# Patient Record
Sex: FEMALE | Race: WHITE
[De-identification: names, ages, dates, MRNs, and addresses within clinical notes are randomized per-mention and may not be internally consistent; named-entity substitution may affect disease eponyms.]

---

## 2018-06-12 ENCOUNTER — HOSPITAL ENCOUNTER (INPATIENT)
Dept: HOSPITAL 1 - ED | Age: 48
LOS: 2 days | Discharge: HOME | DRG: 194 | End: 2018-06-14
Attending: FAMILY MEDICINE | Admitting: FAMILY MEDICINE
Payer: COMMERCIAL

## 2018-06-12 VITALS
HEIGHT: 60.98 IN | HEIGHT: 60.98 IN | WEIGHT: 201.17 LBS | WEIGHT: 201.17 LBS | BODY MASS INDEX: 37.98 KG/M2 | BODY MASS INDEX: 37.98 KG/M2

## 2018-06-12 VITALS — DIASTOLIC BLOOD PRESSURE: 60 MMHG | SYSTOLIC BLOOD PRESSURE: 118 MMHG

## 2018-06-12 DIAGNOSIS — E83.42: ICD-10-CM

## 2018-06-12 DIAGNOSIS — J20.9: ICD-10-CM

## 2018-06-12 DIAGNOSIS — E87.6: ICD-10-CM

## 2018-06-12 DIAGNOSIS — I50.43: ICD-10-CM

## 2018-06-12 DIAGNOSIS — E66.9: ICD-10-CM

## 2018-06-12 DIAGNOSIS — D50.9: ICD-10-CM

## 2018-06-12 DIAGNOSIS — Z83.3: ICD-10-CM

## 2018-06-12 DIAGNOSIS — E11.65: ICD-10-CM

## 2018-06-12 DIAGNOSIS — Z90.49: ICD-10-CM

## 2018-06-12 DIAGNOSIS — I11.0: Primary | ICD-10-CM

## 2018-06-12 DIAGNOSIS — Z79.899: ICD-10-CM

## 2018-06-12 LAB
ALBUMIN SERPL-MCNC: 3.4 G/DL (ref 3.4–5)
ALP SERPL-CCNC: 121 U/L (ref 46–116)
ALT SERPL-CCNC: 26 U/L (ref 14–59)
AMPHETAMINES UR QL SCN: (no result)
AST SERPL-CCNC: 17 U/L (ref 15–37)
BASOPHILS NFR BLD: 0 % (ref 0–2)
BILIRUB SERPL-MCNC: 0.9 MG/DL (ref 0.2–1)
BUN SERPL-MCNC: 16 MG/DL (ref 7–18)
CALCIUM SERPL-MCNC: 8.7 MG/DL (ref 8.5–10.1)
CHLORIDE SERPL-SCNC: 105 MMOL/L (ref 98–107)
CHOLEST SERPL-MCNC: 118 MG/DL (ref ?–200)
CHOLEST/HDLC SERPL: 2.6 MG/DL
CO2 SERPL-SCNC: 23.5 MMOL/L (ref 21–32)
CREAT SERPL-MCNC: 0.9 MG/DL (ref 0.6–1)
ERYTHROCYTE [DISTWIDTH] IN BLOOD BY AUTOMATED COUNT: 18.6 % (ref 11.5–14.5)
GFR SERPLBLD BASED ON 1.73 SQ M-ARVRAT: > 60 ML/MIN
GLUCOSE SERPL-MCNC: 179 MG/DL (ref 74–106)
HDLC SERPL-MCNC: 46 MG/DL (ref 40–60)
LIPASE SERPL-CCNC: 99 IU/L (ref 73–393)
MAGNESIUM SERPL-MCNC: 1.6 MG/DL (ref 1.8–2.4)
MICROSCOPIC UR-IMP: NO
MONOCYTES NFR BLD: 7 % (ref 0–7)
NEUTS BAND NFR BLD: 0 % (ref 0–10)
NEUTS SEG NFR BLD MANUAL: 75 % (ref 37–75)
OSMOLALITY SERPL: 291 MOSM/KG (ref 278–298)
PHOSPHATE SERPL-MCNC: 2.8 MG/DL (ref 2.5–4.9)
PLAT MORPH BLD: (no result)
PLATELET # BLD: 177 X10^3MCL (ref 130–400)
POTASSIUM SERPL-SCNC: 3.7 MMOL/L (ref 3.5–5.1)
PROT SERPL-MCNC: 7.5 G/DL (ref 6.4–8.2)
RBC # UR STRIP.AUTO: NEGATIVE /UL
RBC MORPH BLD: (no result)
SODIUM SERPL-SCNC: 140 MMOL/L (ref 136–145)
T3 SERPL-MCNC: 0.52 NG/ML
T3RU NFR SERPL: 31 % UPTAKE (ref 30–39)
T4 FREE SERPL-MCNC: 1.36 NG/DL (ref 0.76–1.46)
T4 FREE SERPL-MCNC: 1.38 NG/DL (ref 0.76–1.46)
T4 SERPL-MCNC: 7.6 UG/DL (ref 4.7–13.3)
T4/T3 UPTAKE INDEX SERPL: 2.4 UG/DL (ref 1.4–4.5)
TRIGL SERPL-MCNC: 53 MG/DL (ref ?–150)
UA SPECIFIC GRAVITY: <=1.005 (ref 1–1.03)

## 2018-06-13 VITALS — DIASTOLIC BLOOD PRESSURE: 63 MMHG | SYSTOLIC BLOOD PRESSURE: 111 MMHG

## 2018-06-13 VITALS — DIASTOLIC BLOOD PRESSURE: 52 MMHG | SYSTOLIC BLOOD PRESSURE: 101 MMHG

## 2018-06-13 VITALS — DIASTOLIC BLOOD PRESSURE: 69 MMHG | SYSTOLIC BLOOD PRESSURE: 129 MMHG

## 2018-06-13 VITALS — DIASTOLIC BLOOD PRESSURE: 52 MMHG | SYSTOLIC BLOOD PRESSURE: 99 MMHG

## 2018-06-13 VITALS — DIASTOLIC BLOOD PRESSURE: 56 MMHG | SYSTOLIC BLOOD PRESSURE: 115 MMHG

## 2018-06-13 LAB
BASOPHILS NFR BLD: 0.2 % (ref 0–2)
BUN SERPL-MCNC: 15 MG/DL (ref 7–18)
CALCIUM SERPL-MCNC: 8.8 MG/DL (ref 8.5–10.1)
CHLORIDE SERPL-SCNC: 107 MMOL/L (ref 98–107)
CO2 SERPL-SCNC: 25.5 MMOL/L (ref 21–32)
CREAT SERPL-MCNC: 0.8 MG/DL (ref 0.6–1)
ERYTHROCYTE [DISTWIDTH] IN BLOOD BY AUTOMATED COUNT: 18.1 % (ref 11.5–14.5)
GFR SERPLBLD BASED ON 1.73 SQ M-ARVRAT: > 60 ML/MIN
GLUCOSE SERPL-MCNC: 174 MG/DL (ref 74–106)
IRON SERPL-MCNC: 17 UG/DL (ref 50–170)
MAGNESIUM SERPL-MCNC: 1.5 MG/DL (ref 1.8–2.4)
PHOSPHATE SERPL-MCNC: 3 MG/DL (ref 2.5–4.9)
PLATELET # BLD: 143 X10^3MCL (ref 130–400)
POTASSIUM SERPL-SCNC: 3.1 MMOL/L (ref 3.5–5.1)
RBC # BLD AUTO: 3.93 M/MM3 (ref 4.1–5.1)
SODIUM SERPL-SCNC: 143 MMOL/L (ref 136–145)
TIBC SERPL-MCNC: 448 UG/DL (ref 250–450)

## 2018-06-14 VITALS — DIASTOLIC BLOOD PRESSURE: 53 MMHG | SYSTOLIC BLOOD PRESSURE: 100 MMHG

## 2018-06-14 VITALS — DIASTOLIC BLOOD PRESSURE: 61 MMHG | SYSTOLIC BLOOD PRESSURE: 104 MMHG

## 2018-06-14 VITALS — SYSTOLIC BLOOD PRESSURE: 109 MMHG | DIASTOLIC BLOOD PRESSURE: 58 MMHG

## 2018-06-14 LAB
BASOPHILS NFR BLD: 0.3 % (ref 0–2)
BUN SERPL-MCNC: 16 MG/DL (ref 7–18)
CALCIUM SERPL-MCNC: 8.4 MG/DL (ref 8.5–10.1)
CHLORIDE SERPL-SCNC: 106 MMOL/L (ref 98–107)
CO2 SERPL-SCNC: 29.4 MMOL/L (ref 21–32)
CREAT SERPL-MCNC: 0.9 MG/DL (ref 0.6–1)
ERYTHROCYTE [DISTWIDTH] IN BLOOD BY AUTOMATED COUNT: 18 % (ref 11.5–14.5)
GFR SERPLBLD BASED ON 1.73 SQ M-ARVRAT: > 60 ML/MIN
GLUCOSE SERPL-MCNC: 102 MG/DL (ref 74–106)
MAGNESIUM SERPL-MCNC: 1.3 MG/DL (ref 1.8–2.4)
PLATELET # BLD: 160 X10^3MCL (ref 130–400)
POTASSIUM SERPL-SCNC: 3.6 MMOL/L (ref 3.5–5.1)
SODIUM SERPL-SCNC: 143 MMOL/L (ref 136–145)

## 2018-06-25 ENCOUNTER — HOSPITAL ENCOUNTER (EMERGENCY)
Dept: HOSPITAL 1 - ED | Age: 48
LOS: 1 days | Discharge: HOME | End: 2018-06-26
Payer: COMMERCIAL

## 2018-06-25 VITALS
HEIGHT: 62.99 IN | BODY MASS INDEX: 32.78 KG/M2 | WEIGHT: 184.99 LBS | WEIGHT: 184.99 LBS | HEIGHT: 62.99 IN | BODY MASS INDEX: 32.78 KG/M2

## 2018-06-25 DIAGNOSIS — E11.65: ICD-10-CM

## 2018-06-25 DIAGNOSIS — Z90.49: ICD-10-CM
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
## 2018-06-25 DIAGNOSIS — S60.221A: Primary | ICD-10-CM

## 2018-06-25 LAB
BASOPHILS NFR BLD: 0 % (ref 0–2)
BUN SERPL-MCNC: 21 MG/DL (ref 7–18)
CALCIUM SERPL-MCNC: 8.8 MG/DL (ref 8.5–10.1)
CHLORIDE SERPL-SCNC: 101 MMOL/L (ref 98–107)
CO2 SERPL-SCNC: 19.6 MMOL/L (ref 21–32)
CREAT SERPL-MCNC: 1.4 MG/DL (ref 0.6–1)
ERYTHROCYTE [DISTWIDTH] IN BLOOD BY AUTOMATED COUNT: 20.1 % (ref 11.5–14.5)
GFR SERPLBLD BASED ON 1.73 SQ M-ARVRAT: 43 ML/MIN
GLUCOSE SERPL-MCNC: 539 MG/DL (ref 74–106)
PLATELET # BLD: 203 X10^3MCL (ref 130–400)
POTASSIUM SERPL-SCNC: 3.5 MMOL/L (ref 3.5–5.1)
SODIUM SERPL-SCNC: 134 MMOL/L (ref 136–145)

## 2018-06-26 VITALS — SYSTOLIC BLOOD PRESSURE: 115 MMHG | DIASTOLIC BLOOD PRESSURE: 68 MMHG

## 2018-12-28 ENCOUNTER — HOSPITAL ENCOUNTER (INPATIENT)
Dept: HOSPITAL 1 - ED | Age: 48
LOS: 1 days | Discharge: HOME | DRG: 204 | End: 2018-12-29
Attending: INTERNAL MEDICINE | Admitting: INTERNAL MEDICINE
Payer: COMMERCIAL

## 2018-12-28 VITALS
WEIGHT: 207.38 LBS | WEIGHT: 207.38 LBS | BODY MASS INDEX: 35.41 KG/M2 | HEIGHT: 64 IN | BODY MASS INDEX: 35.41 KG/M2 | HEIGHT: 64 IN

## 2018-12-28 VITALS — DIASTOLIC BLOOD PRESSURE: 74 MMHG | SYSTOLIC BLOOD PRESSURE: 138 MMHG

## 2018-12-28 VITALS — DIASTOLIC BLOOD PRESSURE: 67 MMHG | SYSTOLIC BLOOD PRESSURE: 112 MMHG

## 2018-12-28 DIAGNOSIS — Z79.899: ICD-10-CM

## 2018-12-28 DIAGNOSIS — Z23: ICD-10-CM

## 2018-12-28 DIAGNOSIS — Z83.3: ICD-10-CM

## 2018-12-28 DIAGNOSIS — R55: Primary | ICD-10-CM

## 2018-12-28 DIAGNOSIS — Z87.442: ICD-10-CM

## 2018-12-28 DIAGNOSIS — M94.0: ICD-10-CM

## 2018-12-28 DIAGNOSIS — I10: ICD-10-CM

## 2018-12-28 DIAGNOSIS — I20.9: ICD-10-CM

## 2018-12-28 DIAGNOSIS — Z79.84: ICD-10-CM

## 2018-12-28 DIAGNOSIS — N17.0: ICD-10-CM

## 2018-12-28 DIAGNOSIS — E11.65: ICD-10-CM

## 2018-12-28 DIAGNOSIS — Z82.49: ICD-10-CM

## 2018-12-28 DIAGNOSIS — E86.0: ICD-10-CM

## 2018-12-28 DIAGNOSIS — E66.9: ICD-10-CM

## 2018-12-28 LAB
ALBUMIN SERPL-MCNC: 3.5 G/DL (ref 3.4–5)
ALP SERPL-CCNC: 147 U/L (ref 46–116)
ALT SERPL-CCNC: 41 U/L (ref 14–59)
AMYLASE SERPL-CCNC: 41 U/L (ref 25–115)
AST SERPL-CCNC: 16 U/L (ref 15–37)
BASOPHILS NFR BLD: 0.4 % (ref 0–2)
BILIRUB SERPL-MCNC: 0.5 MG/DL (ref 0.2–1)
BUN SERPL-MCNC: 26 MG/DL (ref 7–18)
CALCIUM SERPL-MCNC: 9.3 MG/DL (ref 8.5–10.1)
CHLORIDE SERPL-SCNC: 105 MMOL/L (ref 98–107)
CHOLEST SERPL-MCNC: 153 MG/DL (ref ?–200)
CHOLEST/HDLC SERPL: 2.3 MG/DL
CO2 SERPL-SCNC: 24.9 MMOL/L (ref 21–32)
CREAT SERPL-MCNC: 1.1 MG/DL (ref 0.6–1)
ERYTHROCYTE [DISTWIDTH] IN BLOOD BY AUTOMATED COUNT: 24 % (ref 11.5–14.5)
GFR SERPLBLD BASED ON 1.73 SQ M-ARVRAT: 57 ML/MIN
GLUCOSE SERPL-MCNC: 192 MG/DL (ref 74–106)
HDLC SERPL-MCNC: 68 MG/DL (ref 40–60)
MAGNESIUM SERPL-MCNC: 2 MG/DL (ref 1.8–2.4)
PHOSPHATE SERPL-MCNC: 4 MG/DL (ref 2.5–4.9)
PLATELET # BLD: 133 X10^3MCL (ref 130–400)
POTASSIUM SERPL-SCNC: 4 MMOL/L (ref 3.5–5.1)
PROT SERPL-MCNC: 7.6 G/DL (ref 6.4–8.2)
SODIUM SERPL-SCNC: 140 MMOL/L (ref 136–145)
T3 SERPL-MCNC: 0.78 NG/ML
T3RU NFR SERPL: 34 % UPTAKE (ref 30–39)
T4 FREE SERPL-MCNC: 1.17 NG/DL (ref 0.76–1.46)
T4 SERPL-MCNC: 11.3 UG/DL (ref 4.7–13.3)
T4/T3 UPTAKE INDEX SERPL: 3.8 UG/DL (ref 1.4–4.5)
TRIGL SERPL-MCNC: 52 MG/DL (ref ?–150)

## 2018-12-28 NOTE — NUR
PATIENT COMPLAINED OF CHEST DISCOMFORT AND MID EPIGASTRIC ABDOMINAL PAIN, PS
9/10. MEDICATED WITH NORCO 7.5/325 MG PO AS ORDERED. WILL CONTINUE TO MONITOR.

## 2018-12-28 NOTE — NUR
PATIENT RECEIVED AWAKE, ALERT, ORIENTED X4. SHE IS TILL COMPLAINING OF
DIZZINESS. Mongolian SPEAKING. RESPIRATION EVEN AND UNLABORED, ON O2 2L PER
NASAL CANNULA. ONGOING 0.9% NS AT 50 CC/HR INFUSING WELL AT THE LEFT HAND. C/O
NUMBNESS TO LUE/LLE. VOIDING FREELY WITHOUT DIFFICULTY. GENERALIZED WEAKNESS
TO EXTREMITIES, SLOW GAIT. SKIN DRY AND INTACT. ONGOING 0.9% NS AT 50 CC/HR
INFUSING WELL AT THE LEFT HAND. ON TELE #[id number]. WILL CONTINUE TO MONITOR.

## 2018-12-28 NOTE — NUR
PATIENT IN ED WITH C/O CHEST PAIN/PRESSURE WITH PAIN TO LEFT ARM X2DAYS.
REPORTS PAIN IS WORSE WHEN TAKING A DEEP BREATHING. PT. AAOX4, TALKING AND
RESPONDING APPROPRIETLY. FAMILY AT BEDSIDE

## 2018-12-28 NOTE — NUR
PATIENT SON AT BEDSIDE, REPORTS HE AND PATIENT GOT INTO AN ARGUMENT EARLIER
TODAY AND PATIENT HAD EPISODE OF [employer]NCOPE. REPORTS SHE GAVE HIM A HUG AND THEN
FAINTED TO FLOOR FOR APROX 5 SECONDS. SON STATES HE GAVE HER "MOUTH TO MOUTH"
AND THEN PATIENT WOKE UP. SON STATES PATIENT HAS HAD SYNCOPE EPISODES FOR
"YEARS" EVERYTIME SHE GETS UPSET AND HIM AND HIS SIBLINGS ARE "USE TO" GIVING
HER "MOUTH TO MOUTH" WHENEVER SHE GETS UPSET AND FAINTS. PATIENT ATTACHED TO
CARDIAC MONITOR, NOT IN ANY DISTRESS, CALL LIGHT WITHIN REACH.

## 2018-12-28 NOTE — NUR
RECEIVED PT FROM ED VIA [employer], CAME IN DUE TO SYNCOPE FOR ABOUT 1 MINUTE
AFTER HAVING AN ARGUMENT W/ A FAMILY MEMBER. PT IS AAOX4. C/O DIZZINESS WORSE
ON AMBULATION. HAND  ARE WEAK. NO FACIAL DROOP/ARM DRFIT NOTED. C/O
NUMBNESS ON THE LEFT ARM AND LEFT LEG. NO SOB, LUNG SOUNDS CTA. O2 SAT=100% ON
2LPM/NC. C/O CHEST PAIN DESCRIBED AS PRESSURE RADIATING TO THE LEFT ARM, NSR
ON THE MONITOR. C/O NAUSEA. DENIES ABDOMINAL PAIN. VOIDS FREELY. IV SITE ON
THE LEFT HAND IS PATENT AND INTACT.  SIDE RAILS UPX2. CALL LIGHT ON REACH.
PRIMARY NURSE DAMIEN AT BEDSIDE FOR CONTINUITY OF CARE.

## 2018-12-28 NOTE — NUR
PT RESTING IN BED. NO ACUTE DISTRESS. AAOX4. RESP EVEN AND UNLABORED ON 2L NC.
EATING DINNER AT THIS TIME. REMINDED PT URINE SAMPLE IS NEEDED, PT VERBALIZED
UNDERSTANDING. FALL PRECAUTIONS IN PLACE. BED IN LOW POSITION, CALL LIGHT
WITHIN REACH. WILL ENDORSE TO ONCOMING SHIFT.

## 2018-12-29 VITALS — DIASTOLIC BLOOD PRESSURE: 63 MMHG | SYSTOLIC BLOOD PRESSURE: 114 MMHG

## 2018-12-29 VITALS — DIASTOLIC BLOOD PRESSURE: 58 MMHG | SYSTOLIC BLOOD PRESSURE: 106 MMHG

## 2018-12-29 VITALS — SYSTOLIC BLOOD PRESSURE: 104 MMHG | DIASTOLIC BLOOD PRESSURE: 55 MMHG

## 2018-12-29 LAB
AMPHETAMINES UR QL SCN: (no result)
BASOPHILS NFR BLD: 0.4 % (ref 0–2)
BUN SERPL-MCNC: 23 MG/DL (ref 7–18)
CALCIUM SERPL-MCNC: 8.7 MG/DL (ref 8.5–10.1)
CHLORIDE SERPL-SCNC: 107 MMOL/L (ref 98–107)
CO2 SERPL-SCNC: 27 MMOL/L (ref 21–32)
CREAT SERPL-MCNC: 0.9 MG/DL (ref 0.6–1)
ERYTHROCYTE [DISTWIDTH] IN BLOOD BY AUTOMATED COUNT: 23.6 % (ref 11.5–14.5)
GFR SERPLBLD BASED ON 1.73 SQ M-ARVRAT: > 60 ML/MIN
GLUCOSE SERPL-MCNC: 80 MG/DL (ref 74–106)
MAGNESIUM SERPL-MCNC: 1.9 MG/DL (ref 1.8–2.4)
MICROSCOPIC UR-IMP: NO
PHOSPHATE SERPL-MCNC: 5.2 MG/DL (ref 2.5–4.9)
PLATELET # BLD: 115 X10^3MCL (ref 130–400)
POTASSIUM SERPL-SCNC: 3.9 MMOL/L (ref 3.5–5.1)
RBC # UR STRIP.AUTO: NEGATIVE /UL
SODIUM SERPL-SCNC: 143 MMOL/L (ref 136–145)
UA SPECIFIC GRAVITY: 1.02 (ref 1–1.03)

## 2018-12-29 NOTE — NUR
RECEIVED PT IN NO ACUTE DISTRESS. ASLEEP, AROUSABLE. RESP EVEN AND UNLABORED
ON RA. APPEARS COMFORTABLE, NO PAIN NOTED. IVF INFUSING, NO REDNESS OR
SWELLING TO L HAND. SR ON TELE MONITOR, HR 68. FALL PRECAUTIONS IN PLACE. BED
IN LOW POSITION, CALL LIGHT WITHIN REACH. WILL CONTINUE TO MONITOR.

## 2018-12-29 NOTE — NUR
PT DISCHARGED TO HOME IN NO ACUTE DISTRESS. AWAKE, ALERT, AND ORIENTED. VSS.
TRANSPORTED VIA WHEELCHAIR. PT'S SONS AT BEDSIDE FOR TRANSLATION. DISCHARGE
EDUCATION PROVIDED, PT AND SONS VERBALIZED UNDERSTANDING. INSTRUCTED PT TO
FOLLOW UP WITH PCP WITHIN 1 WEEK. IV DC'D WITH CATHETER INTACT. TELE REMOVED.
BELONGINGS WITH PT. [facility] CNA ACCOMPANIED PT TO LOBBY.

## 2018-12-29 NOTE — NUR
PATIENT COMPLAINED OF SEVERE HEADACHE AND CHEST DISCOMFORT, PS 10/10.
MEDICATED WITH NORCO 7.5/325 MG PO AS ORDERED. WILL CONTINUE TO MONITOR.

## 2018-12-29 NOTE — NUR
PATIENT RESTING IN BED. RESPIRATION EVEN AND UNLABORED, ON O2 2L PER NASAL
CANNULA. VERBALIZED SHE STILL HAS A LITTLE BIT OF DIZZINESS. IV SITE NO SIGN
OF INFILTRATION. ASSISTED WITH NEEDS AND AMBULATING. SAFETY OBSERVED. PLACED
BED IN THE LOWEST POSITION. PLACED CALL LIGHT WITHIN REACH AT ALL TIMES.

## 2018-12-29 NOTE — NUR
PT RESTING IN BED. NO ACUTE DISTRESS. BREATHING EVEN AND UNLABORED ON 2L NC.
IVF INFUSING, NO REDNESS OR SWELLING NOTED. FALL PRECAUTIONS IN PLACE. CALL
LIGHT WITHIN REACH. WILL CONTINUE TO MONITOR.

## 2020-09-12 ENCOUNTER — HOSPITAL ENCOUNTER (EMERGENCY)
Dept: HOSPITAL 1 - ED | Age: 50
Discharge: HOME | End: 2020-09-12
Payer: COMMERCIAL

## 2020-09-12 VITALS — BODY MASS INDEX: 39.99 KG/M2 | WEIGHT: 240 LBS | HEIGHT: 65 IN

## 2020-09-12 VITALS — SYSTOLIC BLOOD PRESSURE: 127 MMHG | DIASTOLIC BLOOD PRESSURE: 76 MMHG

## 2020-09-12 DIAGNOSIS — F41.1: Primary | ICD-10-CM

## 2020-09-12 DIAGNOSIS — I10: ICD-10-CM

## 2020-09-12 DIAGNOSIS — Z86.2: ICD-10-CM
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
## 2020-09-12 DIAGNOSIS — E11.9: ICD-10-CM
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
